# Patient Record
(demographics unavailable — no encounter records)

---

## 2025-06-12 NOTE — HISTORY OF PRESENT ILLNESS
[FreeTextEntry1] :  Subjective:   - Summary: New patient presenting with painful, thickening, elongated, onychomycotic, onychogryphotic, dystrophic toenails affecting all toes, as well as generalized bilateral foot pain.   - Chief Complaint (CC): Bilateral foot pain and toenail issues   - History of Present Illness (HPI): Patient reports painful, thickening, elongated, onychomycotic, onychogryphotic, dystrophic toenails affecting all 10 toes. The patient also complains of generalized bilateral foot pain.    Objective:   - Diagnostic Results:    - Vital Signs:    - Physical Examination (PE): Dorsalis pedis pulse: 2/4 bilaterally. Posterior tibial pulse: 1/4 bilaterally. Temperature gradient is within normal limits bilaterally. capillary return: 2 seconds for all 10 toes. There are thickened, elongated, painful, onychomycotic, onychogryphottic, dystrophic toenails observed on all 10 toes. Skin is well hydrated with good turgor. NVSI bilaterally..   Assessment:      -Onychomycosis -Onychogryphosis- -Left foot pain -Right foot pain        Plan:  -Exam. -Aseptic mechanical debridement of thickened, elongated, painful, onychomycotic, onychogryphotic, dystrophic toenails times ten -RTO: 2 months.

## 2025-06-12 NOTE — PROCEDURE
[FreeTextEntry1] :  Aseptic mechanical debridement of thickened, elongated, dystrophic, painful mycotic toenails times ten.